# Patient Record
Sex: MALE | Race: BLACK OR AFRICAN AMERICAN
[De-identification: names, ages, dates, MRNs, and addresses within clinical notes are randomized per-mention and may not be internally consistent; named-entity substitution may affect disease eponyms.]

---

## 2020-11-26 ENCOUNTER — HOSPITAL ENCOUNTER (EMERGENCY)
Dept: HOSPITAL 56 - MW.ED | Age: 25
Discharge: HOME | End: 2020-11-26
Payer: SELF-PAY

## 2020-11-26 DIAGNOSIS — K02.9: ICD-10-CM

## 2020-11-26 DIAGNOSIS — F17.210: ICD-10-CM

## 2020-11-26 DIAGNOSIS — K05.219: Primary | ICD-10-CM

## 2020-11-26 PROCEDURE — 99283 EMERGENCY DEPT VISIT LOW MDM: CPT

## 2020-11-26 NOTE — EDM.PDOC
ED HPI GENERAL MEDICAL PROBLEM





- General


Chief Complaint: General


Stated Complaint: SWOLLEN FACE


Time Seen by Provider: 11/26/20 20:40





- History of Present Illness


INITIAL COMMENTS - FREE TEXT/NARRATIVE: 





History of present illness:


[] Has a toothache and swelling of the face.  The patient has chronic recurring 

problems with his molars on the upper left side.  He cannot afford a dentist at 

this time and is new to this area.  The patient does not have diabetes and does 

not smoke.  He does not have any systemic illness.  He does not have any 

systemic signs of infection.  He does not have change in appetite fever cough 

weakness.  The patient does have swelling in the left side of the face and pain 

in the upper molars on the left side and the  maxillary area.  The patient 

symptoms have come up over the last 2days is an acute infectious process 

affecting teeth that are chronically sore and sensitive.





Review of systems: 


As per history of present illness and below otherwise all systems reviewed and 

negative.





Past medical history: 


As per history of present illness and as reviewed below otherwise 

noncontributory.





Surgical history: 


As per history of present illness and as reviewed below otherwise 

noncontributory.





Social history: 


No reported history of drug or alcohol abuse.





Family history: 


As per history of present illness and as reviewed below otherwise 

noncontributory.





Physical exam:


Constitutional - well developed, well-nourished and in no acute distress


HEENT -teeth #15 and 16 are carious and the gingiva surrounding them are 

inflamed.  There is swelling of the tissues of the left side of the zygomatic 

area and proximal mandible area.  No trismus the voice is normal and he manages 

his own secretions.  Adenopathy in the neck.  Neck is supple.  Normocephalic, no

 evidence of trauma - external nose and mouth normal - no mass in neck and no 

JVD - mucosae moist





EYES - full EOM, PERRL, no icterus - no evidence of inflammation, injection, or 

drainage





Respiratory - no respiratory distress, equal bilateral expansion





Cardiovascular - Regular Rhythm with S1 and S2 appreciated and no murmur, gallop

 or rub.





GI - abdomen soft without distension or organomegaly - normal bowel sounds - no 

guard or rebound





Musculoskeletal  no gross deformity of long bones or joints - no tenderness, 

swelling or edema





Neurologic - Alert and oriented times four - CN II-XII grossly intact - motor 

sensory and coordination symmetrically normal





Psychiatric - appropriate mood and affect with normal thought content





Hematologic - No petechiae or purpura - mucosa appropriate color and sclera not 

pale - normal nail bed color and refill





Integument - no rash or evidence of trauma - normal turgor





Diagnostics:


[]





Therapeutics:


[]





Impression: 


[]





Plan:


[]





Definitive disposition and diagnosis as appropriate pending reevaluation and 

review of above.








  ** Left Upper Tooth/Teeth


Pain Score (Numeric/FACES): 2





- Related Data


                                    Allergies











Allergy/AdvReac Type Severity Reaction Status Date / Time


 


No Known Allergies Allergy   Verified 11/26/20 20:41











Home Meds: 


                                    Home Meds





Acetaminophen/Codeine [Tylenol with Codeine No.3 300MG/30MG] 2 tab PO Q4H PRN 

#20 tab 11/26/20 [Rx]


Clindamycin HCl 300 mg PO TID #21 capsule 11/26/20 [Rx]











Past Medical History





- Past Health History


Medical/Surgical History: Denies Medical/Surgical History





Social & Family History





- Tobacco Use


Tobacco Use Status *Q: Current Every Day Tobacco User


Years of Tobacco use: 8


Packs/Tins Daily: 1





- Caffeine Use


Caffeine Use: Reports: Coffee, Energy Drinks





- Recreational Drug Use


Recreational Drug Use: No





ED ROS GENERAL





- Review of Systems


Review Of Systems: Comprehensive ROS is negative, except as noted in HPI.





ED EXAM, GENERAL





- Physical Exam


Exam: See Below


Free Text/Narrative:: 





My physical exam is in the HPI





Course





- Vital Signs


Last Recorded V/S: 





                                Last Vital Signs











Temp  36.9 C   11/26/20 20:37


 


Pulse  82   11/26/20 20:37


 


Resp  17   11/26/20 20:37


 


BP  126/82   11/26/20 20:37


 


Pulse Ox  96   11/26/20 20:37














- Orders/Labs/Meds


Meds: 





Medications














Discontinued Medications














Generic Name Dose Route Start Last Admin





  Trade Name Freq  PRN Reason Stop Dose Admin


 


Clindamycin HCl  300 mg  11/26/20 20:53 





  Cleocin  PO  11/26/20 20:54 





  ONETIME ONE  














Departure





- Departure


Time of Disposition: 20:58


Disposition: Home, Self-Care 01


Condition: Good


Clinical Impression: 


 Dental abscess, Periodontal disease, Caries








- Discharge Information


Prescriptions: 


Clindamycin HCl 300 mg PO TID #21 capsule


Acetaminophen/Codeine [Tylenol with Codeine No.3 300MG/30MG] 2 tab PO Q4H PRN 

#20 tab


 PRN Reason: Pain (Moderate 4-6)


Instructions:  Dental Abscess, Easy-to-Read


Referrals: 


PCP,None [Primary Care Provider] - 


Additional Instructions: 


[facility] - Primary Care


[address] [place] [address]


Phone: [phone]


Fax: [phone]








[facility]


[address] [place] [address]


Phone: [phone]


Fax: [phone]








The following information is given to patients seen in the emergency department 

who are being discharged to home. This information is to outline your options 

for follow-up care. We provide all patients seen in our emergency department 

with a follow-up referral.





The need for follow-up, as well as the timing and circumstances, are variable 

depending upon the specifics of your emergency department visit.





If you don't have a primary care physician on staff, we will provide you with a 

referral. We always advise you to contact your personal physician following an 

emergency department visit to inform them of the circumstance of the visit and 

for follow-up with them and/or the need for any referrals to a consulting 

specialist.





The emergency department will also refer you to a specialist when appropriate. 

This referral assures that you have the opportunity for follow-up care with a 

specialist. All of these measure are taken in an effort to provide you with 

optimal care, which includes your follow-up.





Under all circumstances we always encourage you to contact your private 

physician who remains a resource for coordinating your care. When calling for 

follow-up care, please make the office aware that this follow-up is from your 

recent emergency room visit. If for any reason you are refused follow-up, please

contact the [facility] Emergency Department

at [phone] and asked to speak to the emergency department charge nurse.








Sepsis Event Note (ED)





- Evaluation


Sepsis Screening Result: No Definite Risk





- Focused Exam


Vital Signs: 





                                   Vital Signs











  Temp Pulse Resp BP Pulse Ox


 


 11/26/20 20:37  36.9 C  82  17  126/82  96

## 2021-08-28 ENCOUNTER — HOSPITAL ENCOUNTER (EMERGENCY)
Dept: HOSPITAL 56 - MW.ED | Age: 26
Discharge: HOME | End: 2021-08-28
Payer: SELF-PAY
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
## 2021-08-28 DIAGNOSIS — S62.316A: Primary | ICD-10-CM

## 2021-08-28 DIAGNOSIS — S62.141A: ICD-10-CM

## 2021-08-28 DIAGNOSIS — S62.314A: ICD-10-CM

## 2021-08-28 PROCEDURE — 73130 X-RAY EXAM OF HAND: CPT

## 2021-08-28 PROCEDURE — 29125 APPL SHORT ARM SPLINT STATIC: CPT

## 2021-08-28 PROCEDURE — 99283 EMERGENCY DEPT VISIT LOW MDM: CPT

## 2021-08-28 PROCEDURE — 96372 THER/PROPH/DIAG INJ SC/IM: CPT

## 2021-08-28 NOTE — CR
HISTORY:



Hand pain and swelling. Punched a wall.



TECHNIQUE:



Three views of each hand.



COMPARISON:



None.



FINDINGS:



Right hand: Acute mildly displaced intra-articular fracture of the base of 

the 4th metacarpal. Acute mildly displaced intra-articular fracture of the 

ulnar aspect of the hamate at the 5th carpometacarpal joint. Healed 

fracture deformity of the distal 5th metacarpal. Joint spaces are otherwise 

maintained.



Left: Volarly angulated acute fracture of the neck of the 5th metacarpal. 

Bones are otherwise intact. Joint spaces are maintained.



IMPRESSION:



1. Acute mildly displaced intra-articular fracture of the base of the right 

4th metacarpal. 



2. Acute mildly displaced intra-articular fracture of the right hamate at 

the 5th carpometacarpal joint. 



3. Acute angulated fracture of the neck of the left 5th metacarpal.



Dictated by Rusty Verma MD @ 8/28/2021 3:14:54 PM



Signed by Dr. Rusty Verma @ Aug 28 2021  3:14PM

## 2021-08-28 NOTE — EDM.PDOC
<Latisha Liriano - Last Filed: 09/02/21 15:32>





ED HPI GENERAL MEDICAL PROBLEM





- General


Chief Complaint: Upper Extremity Injury/Pain


Stated Complaint: RIGHT HAND INJURY


Time Seen by Provider: 08/28/21 12:50


Source of Information: Reports: Patient


History Limitations: Reports: No Limitations





- History of Present Illness


INITIAL COMMENTS - FREE TEXT/NARRATIVE: 





HISTORY AND PHYSICAL:





History of present illness:


The patient is a 25 year old male who presents to the emergency department for 

complaints of bilateral hand pain and swelling after hitting a wall due to anger

and frustration. The patient denies altercation with anyone. The patient did not

take any medication or apply ice for the pain or swelling.  Patient denies any 

fever, chills, headache, change in vision, syncope or near syncope. Denies any 

chest pain, back pain, shortness of breath or cough. Denies any abdominal pain, 

nausea, vomiting, diarrhea, constipation or dysuria. Has not noted any blood in 

urine or stool. Patient has been eating and drinking appropriately.





Review of systems: 


As per history of present illness and below otherwise all systems reviewed and 

negative.





Past medical history: 


As per history of present illness and as reviewed below otherwise 

noncontributory.





Surgical history: 


As per history of present illness and as reviewed below otherwise noncontrib

utory.





Social history: 


See social history for further information





Family history: 


As per history of present illness and as reviewed below otherwise 

noncontributory.





Physical exam:


General: Well developed and well nourished. Alert and orientated x 3. Nontoxic 

in appearance and in no acute distress. Vital signs are stable and have been 

reviewed by me. Nursing notes were reviewed. 


HEENT: Atraumatic, normocephalic, pupils equal and reactive bilaterally, 

negative for conjunctival pallor or scleral icterus, mucous membranes moist, TMs

normal bilaterally, throat clear, neck supple, nontender, trachea midline. No 

drooling or trismus noted. No meningeal signs. No hot potato voice noted. 


Lungs: Clear to auscultation bilaterally. No wheezes, rales, or rhonchi.   Chest

nontender. Normal work of breathing, no accessory muscles used.


Heart: S1S2, regular rate and rhythm without overt murmur, gallops, or rubs. No 

JVD. No peripheral edema


Abdomen: Soft, nondistended, nontender. Normoactive bowel sounds. Negative for 

masses or costovertebral tenderness.


Skin: Minor abrasion noted on 2nd, 3rd, & 4th, warm, dry. No lesions or rashes 

noted.


Hematologic: No petechiae or purpra. Mucosa appropriate color and normal nail 

bed color and refill.


Extremities: Right hand with increase swelling. Bilateral hand negative 

Nontender in the anatomical snuff box. Moves all bilateral fingers but with 

pain. Bilaterally Neurological intact. No evidence of tendon involvement. Moves 

all other extremities per self without difficulty or deficits, negative for 

cords or calf pain. Neurovascular unremarkable.


Neuro: Awake, alert, oriented. Cranial nerves II through XII unremarkable. 

Cerebellum unremarkable. Motor and sensory unremarkable throughout. Exam 

nonfocal.


Psychiatric: Mood and affect are appropriate.  Normal thought process. Answering

questions appropriately.





Notes:


*This patient was seen and evaluated during the 2020 SARS-CoV-2 novel 

coronavirus pandemic period.  Community viral transmission is ongoing at time of

this encounter and the emergency department is operating under pandemic response

procedures.





As stated above the patient hit a wall with both of his hands causing swelling 

and pain. I have ordered bilateral hand x-rays and will treat his discomfort 

with Toradol 60mg IM. 





The patient's hand x-ray impression #1.  Acute mildly displaced intra-articular 

fracture of the base of the right fourth metacarpal.  2.  Acute mildly displaced

intra- articular fracture of the right hamate at the fifth carpometacarpal 

joint. 3.  Acute angulated fracture of the neck of the fifth left metacarpal. I 

have ordered bilateral splint placement and will have the patient follow up with

the hand surgeon in [place]. The patient is agreeable with the discharge plan. I 

will give the patient a not for work. 





 Post splint application, both hands are Neurovascularly intact.  Due to 

emergent condition of another patient the patient was released prior to my 

personal discharge instructions.  I have verbalized to the patient my 

instructions and he did verbalize understanding.  The patient was given general 

discharge instructions. 








I have talked with the patient about today's findings, in addition to providing 

specific details for plan of care.  Reassessment at the time of disposition 

demonstrates that the patient is in no acute distress.  The patient is stable 

for discharge, counseling was provided and we discussed in great detail signs 

and symptoms that would prompt them to return to the Emergency Department. 

Medication, follow up and supportive care measures were reviewed and discussed. 

Voices understanding and is agreeable to plan of care. Denies any further 

questions or concerns at this time.





Diagnostics: Bilateral hand x-ray. 





Therapeutics:Toradol 60mg





Prescription: Norco 1-2 tabs by mouth every 6-8 hours #16





Impression: Bilateral hand fractures








Plan: Patient left prior to receiving my discharge plan.


1.  You were evaluated today on an emergent basis. Your complaints of bilateral 

hand pain was evaluated with a bilateral hand x-ray. The hand x-ray impression 

#1.  Acute mildly displaced intra-articular fracture of the base of the right 

fourth metacarpal.  2.  Acute mildly displaced intra- articular fracture of the

right hamate at the fifth carpometacarpal joint. 3.  Acute angulated fracture of

the neck of the fifth left metacarpal. Application of a splint on each hand with

instructions to follow up with [place] hand surgeon were given to You. If you 

notice any decrease feeling, or discoloration of your fingers please return to 

the ED. You will be given a note for work. You were given Norco 1-2 tabs by 

mouth every 6-8 hours for pain. 


2.  You can alternate Tylenol and ibuprofen as needed for pain and fever 

management.


3. We encourage you to follow up with your primary care provider and/or 

recommended specialist in the next few days for re-evaluation and further 

care/management. 


4. If your symptoms should worsen, new symptoms develop or any of the signs and 

symptoms we discussed should arise please return to the emergency room or call 

[phone] (if needed).





Definitive disposition and diagnosis as appropriate pending reevaluation and 

review of above.





  ** Bilateral Hand


Pain Score (Numeric/FACES): 10





- Related Data


                                    Allergies











Allergy/AdvReac Type Severity Reaction Status Date / Time


 


No Known Allergies Allergy   Verified 11/26/20 20:41











Home Meds: 


                                    Home Meds





. [No Known Home Meds]  08/28/21 [History]











Past Medical History





- Past Health History


Medical/Surgical History: Denies Medical/Surgical History





Social & Family History





- Caffeine Use


Caffeine Use: Reports: Coffee, Energy Drinks





Review of Systems





- Review of Systems


Review Of Systems: Comprehensive ROS is negative, except as noted in HPI.





ED EXAM, GENERAL





- Physical Exam


Exam: See Below (See dictation)





Departure





- Departure


Time of Disposition: 14:30


Disposition: Home, Self-Care 01


Clinical Impression: 


Fracture, metacarpal


Qualifiers:


 Encounter type: initial encounter Metacarpal bone: fifth Fracture type: closed 

Metacarpal location: base Fracture alignment: displaced Laterality: right 

Qualified Code(s): S62.316A - Displaced fracture of base of fifth metacarpal 

bone, right hand, initial encounter for closed fracture





Closed hamate fracture


Qualifiers:


 Encounter type: initial encounter Hamate bone location: body Fracture 

alignment: displaced Laterality: right Qualified Code(s): S62.141A - Displaced 

fracture of body of hamate [unciform] bone, right wrist, initial encounter for 

closed fracture





Hand fracture, left


Qualifiers:


 Encounter type: initial encounter Fracture type: closed Qualified Code(s): 

S62.92XA - Unspecified fracture of left wrist and hand, initial encounter for 

closed fracture








- Discharge Information


*PRESCRIPTION DRUG MONITORING PROGRAM REVIEWED*: Not Applicable


*COPY OF PRESCRIPTION DRUG MONITORING REPORT IN PATIENT HARITHA: Not Applicable


Instructions:  Cast or Splint Care, Adult, Easy-to-Read, Metacarpal Fracture, 

Easy-to-Read


Referrals: 


PCP,None [Primary Care Provider] - 


Forms:  ED Department Discharge


Additional Instructions: 


[facility]


[address]


[place] [address]


Phone: [phone]


Fax: [phone]








Orthopedic Surgery [place]


[phone]


[id number] [place] [address]


[address], 1st Floor








The following information is given to patients seen in the emergency department 

who are being discharged to home. This information is to outline your options 

for follow-up care. We provide all patients seen in our emergency department 

with a follow-up referral.


The need for follow-up, as well as the timing and circumstances, are variable 

depending upon the specifics of your emergency department visit.


If you don't have a primary care physician on staff, we will provide you with a 

referral. We always advise you to contact your personal physician following an 

emergency department visit to inform them of the circumstance of the visit and 

for follow-up with them and/or the need for any referrals to a consulting 

specialist.


The emergency department will also refer you to a specialist when appropriate. 

This referral assures that you have the opportunity for follow-up care with a 

specialist. All of these measure are taken in an effort to provide you with 

optimal care, which includes your follow-up.





Under all circumstances we always encourage you to contact your private 

physician who remains a resource for coordinating your care. When calling for 

follow-up care, please make the office aware that this follow-up is from your 

recent emergency room visit. If for any reason you are refused follow-up, please

contact the  Emergency Department

at [phone] and asked to speak to the emergency department charge nurse.








Primary Care


[address] [place] [address]


Phone: [phone]


Fax: [phone]





[address] [address]


Phone: [phone]


Fax: [phone]








<Carlos Recinos - Last Filed: 09/08/21 20:20>





Course





- Vital Signs


Last Recorded V/S: 


                                Last Vital Signs











Temp  98.7 F   08/28/21 13:08


 


Pulse  73   08/28/21 14:39


 


Resp  97 H  08/28/21 14:39


 


BP  141/98 H  08/28/21 14:39


 


Pulse Ox  97   08/28/21 13:08














- Orders/Labs/Meds


Meds: 


Medications














Discontinued Medications














Generic Name Dose Route Start Last Admin





  Trade Name Freq  PRN Reason Stop Dose Admin


 


Hydrocodone Bitart/Acetaminophen  2 tab  08/28/21 14:59  08/28/21 15:02





  Acetaminophen/Hydrocodone 325-5 Mg Tab  PO  08/28/21 15:00  2 tab





  ONETIME ONE   Administration


 


Ketorolac Tromethamine  60 mg  08/28/21 13:00  08/28/21 13:53





  Ketorolac 60 Mg/2 Ml Sdv  IM  08/28/21 13:01  60 mg





  ONETIME ONE   Administration














- Re-Assessments/Exams


Free Text/Narrative Re-Assessment/Exam: 





09/08/21 20:20


I was not involved in this pt's care.

## 2022-02-27 ENCOUNTER — HOSPITAL ENCOUNTER (EMERGENCY)
Dept: HOSPITAL 56 - MW.ED | Age: 27
Discharge: LEFT BEFORE DISCHARGE | End: 2022-02-27
Payer: SELF-PAY

## 2022-02-27 DIAGNOSIS — Z53.21: Primary | ICD-10-CM
